# Patient Record
(demographics unavailable — no encounter records)

---

## 2024-11-26 NOTE — DISCUSSION/SUMMARY
[FreeTextEntry1] : 14 year old male presents for headache Ibuprofen 400 mg 1 tab PO dispensed. may repeat dose every 6 hours as needed Counseled re: supportive care and pain management. Encouraged rest.  Reinforced healthy sleep habits. Regular meals and adequate hydration. Encouraged regular exercise, stress management, and avoiding triggers. Return to clinic as needed for new or worsening symptoms.  [facility] form reviewed - no MHI issues/concerns identified. encouraged to maintain therapy sessions with counselor

## 2024-11-26 NOTE — RISK ASSESSMENT
[Eats meals with family] : eats meals with family [Has family members/adults to turn to for help] : has family members/adults to turn to for help [Is permitted and is able to make independent decisions] : Is permitted and is able to make independent decisions [Eats regular meals including adequate fruits and vegetables] : eats regular meals including adequate fruits and vegetables [Calcium source] : calcium source [Has concerns about body or appearance] : does not have concerns about body or appearance [Has friends] : has friends [At least 1 hour of physical activity a day] : at least 1 hour of physical activity a day [Screen time (except homework) less than 2 hours a day] : no screen time (except homework) less than 2 hours a day [Has interests/participates in community activities/volunteers] : does not have interests/participates in community activities/volunteers [Uses tobacco] : does not use tobacco [Uses drugs] : does not use drugs  [Drinks alcohol] : does not drink alcohol [Home is free of violence] : home is free of violence [Uses safety belts/safety equipment] : uses safety belts/safety equipment  [Impaired/distracted driving] : no impaired/distracted driving [Has peer relationships free of violence] : has peer relationships free of violence [Has/had oral sex] : has not had oral sex [Has had sexual intercourse] : has not had sexual intercourse [Has ways to cope with stress] : has ways to cope with stress [Displays self-confidence] : displays self-confidence [Has problems with sleep] : does not have problems with sleep [Gets depressed, anxious, or irritable/has mood swings] : does not get depressed, anxious, or irritable/has mood swings [Has thought about hurting self or considered suicide] : has not thought about hurting self or considered suicide [With Teen] : teen [de-identified] : lives with mom [de-identified] : 9th grade [employer], biology and 2 more classes [de-identified] : wants to lose weight [de-identified] : started going to gym with mom and plays basketball outside [de-identified] : few weeks ago vaped [de-identified] : virgin, interested in females only but no current girlfriend [de-identified] : bijan with stress by playing with friends

## 2024-11-26 NOTE — HISTORY OF PRESENT ILLNESS
[FreeTextEntry6] : 14 year old male presents to clinic with c/o headache states he left glasses home and has been squinting all day c/o generalized headache 5/10

## 2024-12-09 NOTE — HISTORY OF PRESENT ILLNESS
[FreeTextEntry6] : 14 year old male presents to clinic with c/o feeling dizzy  states dizziness started while coming upstairs from lunch states he did not eat lunch but did have breakfast today c/o headache right temporal throbbing pain 8/10 states classroom is too hot and if the window is open then the heater is near the window

## 2024-12-09 NOTE — DISCUSSION/SUMMARY
[FreeTextEntry1] : 14 year old male presents for headache Ibuprofen 400 mg 1 tab PO dispensed. may repeat dose every 6 hours as needed for pain Counseled re: supportive care and pain management. Encouraged rest.  Reinforced healthy sleep habits. Regular meals and adequate hydration. Encouraged regular exercise, stress management, and avoiding triggers. Return to clinic as needed for new or worsening symptoms.

## 2025-05-27 NOTE — HISTORY OF PRESENT ILLNESS
[FreeTextEntry6] : 15 year old male presents to [facility] for complaints of headache and dizziness Onset: 40 minutes ago Pain at present is: 6/10 Location: bilateral temples Last ate approximately 4 hours ago: croissant and orange juice Has not drank any water today Modifying factors: better: OTC pain reliever He denies n/v, photophobia and phonophobia He denies any other symptoms of illness at present

## 2025-05-27 NOTE — DISCUSSION/SUMMARY
[FreeTextEntry1] : 15 year old male presents to [facility] for headache -Ibuprofen 400 mg 1 tab PO dispensed. -Counseled re: supportive care and pain management. Encouraged rest.  Reinforced healthy sleep habits. Regular meals and adequate hydration. Encouraged regular exercise, stress management, and avoiding triggers.  Return to clinic as needed for new or worsening symptoms.

## 2025-05-27 NOTE — PHYSICAL EXAM
[NL] : no acute distress, alert [FreeTextEntry1] : not ill-appearing [de-identified] : A&O x 4, speech is clear and coherent